# Patient Record
Sex: MALE | Race: OTHER | HISPANIC OR LATINO | URBAN - METROPOLITAN AREA
[De-identification: names, ages, dates, MRNs, and addresses within clinical notes are randomized per-mention and may not be internally consistent; named-entity substitution may affect disease eponyms.]

---

## 2022-10-11 ENCOUNTER — HOSPITAL ENCOUNTER (EMERGENCY)
Facility: MEDICAL CENTER | Age: 22
End: 2022-10-11
Attending: EMERGENCY MEDICINE

## 2022-10-11 VITALS
SYSTOLIC BLOOD PRESSURE: 134 MMHG | DIASTOLIC BLOOD PRESSURE: 81 MMHG | WEIGHT: 125.88 LBS | TEMPERATURE: 97.8 F | RESPIRATION RATE: 16 BRPM | BODY MASS INDEX: 20.23 KG/M2 | HEART RATE: 75 BPM | OXYGEN SATURATION: 99 % | HEIGHT: 66 IN

## 2022-10-11 DIAGNOSIS — R06.00 DYSPNEA, UNSPECIFIED TYPE: ICD-10-CM

## 2022-10-11 LAB — EKG IMPRESSION: NORMAL

## 2022-10-11 PROCEDURE — 93005 ELECTROCARDIOGRAM TRACING: CPT | Performed by: EMERGENCY MEDICINE

## 2022-10-11 PROCEDURE — 93005 ELECTROCARDIOGRAM TRACING: CPT

## 2022-10-11 PROCEDURE — 99283 EMERGENCY DEPT VISIT LOW MDM: CPT

## 2022-10-11 ASSESSMENT — ENCOUNTER SYMPTOMS: DIAPHORESIS: 0

## 2022-10-11 NOTE — ED NOTES
Pt is discharged. Paperwork explained and all questions answered by ERP. All belongings sent with pt upon departure. Pt ambulatory with a steady gait out of ED.
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
## 2022-12-14 ENCOUNTER — APPOINTMENT (OUTPATIENT)
Dept: RADIOLOGY | Facility: MEDICAL CENTER | Age: 22
End: 2022-12-14
Attending: EMERGENCY MEDICINE

## 2022-12-14 ENCOUNTER — HOSPITAL ENCOUNTER (EMERGENCY)
Facility: MEDICAL CENTER | Age: 22
End: 2022-12-14
Attending: EMERGENCY MEDICINE

## 2022-12-14 VITALS
TEMPERATURE: 98.1 F | DIASTOLIC BLOOD PRESSURE: 84 MMHG | WEIGHT: 121.47 LBS | SYSTOLIC BLOOD PRESSURE: 122 MMHG | HEART RATE: 85 BPM | BODY MASS INDEX: 19.52 KG/M2 | RESPIRATION RATE: 18 BRPM | OXYGEN SATURATION: 97 % | HEIGHT: 66 IN

## 2022-12-14 DIAGNOSIS — R00.2 PALPITATIONS: ICD-10-CM

## 2022-12-14 LAB — EKG IMPRESSION: NORMAL

## 2022-12-14 PROCEDURE — 71045 X-RAY EXAM CHEST 1 VIEW: CPT

## 2022-12-14 PROCEDURE — 93005 ELECTROCARDIOGRAM TRACING: CPT | Performed by: EMERGENCY MEDICINE

## 2022-12-14 PROCEDURE — 99283 EMERGENCY DEPT VISIT LOW MDM: CPT

## 2022-12-15 NOTE — DISCHARGE INSTRUCTIONS
This is a recurring problem, your EKG is normal, your physical exam is normal and your chest x-ray is normal.    At this time you are stable for discharge home referral has been placed for primary care for follow-up.  Please call the number provided above to make a follow-up appointment.  Return for change or worsening symptoms

## 2022-12-15 NOTE — ED TRIAGE NOTES
"Ulices Isaacs  Chief Complaint   Patient presents with    Other     Pt ambulatroy to triage with above complaint. Pt needing , Canadian used, but pt informed  he speaksa \"Tasiaaxel Beltrán\".   Difficult to obtain pt's main complaint.  Pt reports he was seen here 2.5 months ago for the same. Pt reports same s/s today. Pt states \"I feel like sometimes my heart isn't working right. At times I feel like I can't catch my breathe and I put my had on my chest, over the heart, and I can make myself take a breath.\" Pt speaking in full sentences, NAD noted. VSS.     /74   Pulse 92   Temp 36.5 °C (97.7 °F) (Temporal)   Resp 18   Ht 1.676 m (5' 6\")   Wt 55.1 kg (121 lb 7.6 oz)   SpO2 100%   BMI 19.61 kg/m²     Pt informed of triage process and encouraged to notify staff of any changes or concerns. Pt verbalized understanding of instructions. Apologized for long wait time. Pt placed back in lobby.     "

## 2023-01-15 ENCOUNTER — TELEPHONE (OUTPATIENT)
Dept: HEALTH INFORMATION MANAGEMENT | Facility: OTHER | Age: 23
End: 2023-01-15

## 2023-02-26 ENCOUNTER — HOSPITAL ENCOUNTER (EMERGENCY)
Facility: MEDICAL CENTER | Age: 23
End: 2023-02-26
Attending: EMERGENCY MEDICINE

## 2023-02-26 VITALS
TEMPERATURE: 97 F | DIASTOLIC BLOOD PRESSURE: 67 MMHG | SYSTOLIC BLOOD PRESSURE: 100 MMHG | OXYGEN SATURATION: 98 % | RESPIRATION RATE: 15 BRPM | BODY MASS INDEX: 20.47 KG/M2 | WEIGHT: 119.93 LBS | HEIGHT: 64 IN | HEART RATE: 104 BPM

## 2023-02-26 DIAGNOSIS — K20.90 ESOPHAGITIS, UNSPECIFIED WITHOUT BLEEDING: ICD-10-CM

## 2023-02-26 PROCEDURE — 700102 HCHG RX REV CODE 250 W/ 637 OVERRIDE(OP): Performed by: EMERGENCY MEDICINE

## 2023-02-26 PROCEDURE — 99283 EMERGENCY DEPT VISIT LOW MDM: CPT

## 2023-02-26 PROCEDURE — A9270 NON-COVERED ITEM OR SERVICE: HCPCS | Performed by: EMERGENCY MEDICINE

## 2023-02-26 RX ADMIN — LIDOCAINE HYDROCHLORIDE 30 ML: 20 SOLUTION OROPHARYNGEAL at 13:52

## 2023-02-26 NOTE — ED NOTES
Patient discharged home per ERP.  Discharge teaching and education discussed with patient.   Patient verbalized understanding of discharge teaching and education. No other questions at this time.       VSS. Patient alert and oriented. Patient arranged ride for self. Able to ambulate off unit safely with steady gait.

## 2023-02-26 NOTE — ED PROVIDER NOTES
"ED Provider Note    CHIEF COMPLAINT  Chief Complaint   Patient presents with    Other     Reports that he was eating tortilla chips with beans on it and report that he can feel a chip stuck in his throat.  Pt managing secretions taking sips of water and keeping it down.         EXTERNAL RECORDS REVIEWED  None available    HPI/ROS  LIMITATION TO HISTORY   None  OUTSIDE HISTORIAN(S):  None    Ulices Gandara is a 22 y.o. male who presents for evaluation of the subjective sensation of something stuck in his throat.  The patient reports he was eating a tortilla chip with a rough edge.  When he swallowed it he felt \"scraping \"sensation in his esophagus.  He reports ongoing pain.  He is able to eat and drink.  He specifically denies any hematemesis hemoptysis hematochezia or melena.  Patient is an otherwise healthy 22-year-old male with no significant medical or surgical history.  Incident occurred around an hour ago.  In triage and on arrival here he is drinking water from a bottle and managing his secretions    PAST MEDICAL HISTORY   No significant medical history    SURGICAL HISTORY  patient denies any surgical history  None reported  FAMILY HISTORY  History reviewed. No pertinent family history.  Noncontributory  SOCIAL HISTORY  Social History     Tobacco Use    Smoking status: Never    Smokeless tobacco: Never   Vaping Use    Vaping Use: Never used   Substance and Sexual Activity    Alcohol use: Not Currently    Drug use: Not Currently    Sexual activity: Not on file       CURRENT MEDICATIONS  Home Medications       Reviewed by Agata Clements R.N. (Registered Nurse) on 02/26/23 at 1305  Med List Status: Complete     Medication Last Dose Status        Patient Tello Taking any Medications                           ALLERGIES  No Known Allergies    PHYSICAL EXAM  VITAL SIGNS: /71   Pulse 94   Temp 36.4 °C (97.6 °F) (Temporal)   Resp 16   Ht 1.626 m (5' 4\")   Wt 54.4 kg (119 lb 14.9 oz)   SpO2 99%   " BMI 20.59 kg/m²    Pulse ox interpretation: I interpret this pulse ox as normal.  Constitutional: Alert and oriented x 3, no acute distress  HEENT: Atraumatic normocephalic, pupils are equal round reactive to light extraocular movements are intact. The nares is clear, external ears are normal, mouth shows moist mucous membranes normal dentition for age  Neck: Supple, no JVD no tracheal deviation  Cardiovascular: Regular rate and rhythm no murmur rub or gallop 2+ pulses peripherally x4  Thorax & Lungs: No respiratory distress, no wheezes rales or rhonchi, No chest tenderness.   GI: Soft nontender nondistended positive bowel sounds, no peritoneal signs  Skin: Warm dry no acute rash or lesion  Musculoskeletal: Moving all extremities with full range and 5 of 5 strength no acute  deformity  Neurologic: Cranial nerves III through XII are grossly intact no sensory deficit no cerebellar dysfunction   Psychiatric: Appropriate affect for situation at this time          DIAGNOSTIC STUDIES / PROCEDURES      COURSE & MEDICAL DECISION MAKING    ED Observation Status? No; Patient does not meet criteria for ED Observation.     INITIAL ASSESSMENT, COURSE AND PLAN  Care Narrative: This is a very pleasant 22-year-old gentleman who presents here with a episode of pain after swallowing an unknown masticated chip.  I suspect he has a superficial esophageal abrasion.  He is able to drink water without any spitting up or frothing at the mouth.  I have very low suspicion of complete esophageal obstruction or full-thickness tear.  He was given a GI cocktail with significant relief.  I counseled him to take over-the-counter Maalox  ADDITIONAL PROBLEM LIST    DISPOSITION AND DISCUSSIONS    Escalation of care considered, and ultimately not performed:IV fluids, blood analysis, and diagnostic imaging    Barriers to care at this time, including but not limited to: Patient does not have established PCP.         FINAL DIAGNOSIS  Esophageal  abrasion       Electronically signed by: Mauricio Rodriguez M.D., 2/26/2023 1:43 PM

## 2023-02-26 NOTE — ED TRIAGE NOTES
Pt ambulated to triage with   Chief Complaint   Patient presents with    Other     Reports that he was eating tortilla chips with beans on it and report that he can feel a chip stuck in his throat.  Pt managing secretions taking sips of water and keeping it down.        Pt Informed regarding triage process and verbalized understanding to inform triage tech or RN for any changes in condition. Placed in lobby.

## 2023-02-27 ENCOUNTER — APPOINTMENT (OUTPATIENT)
Dept: RADIOLOGY | Facility: MEDICAL CENTER | Age: 23
End: 2023-02-27
Attending: EMERGENCY MEDICINE

## 2023-02-27 ENCOUNTER — HOSPITAL ENCOUNTER (EMERGENCY)
Facility: MEDICAL CENTER | Age: 23
End: 2023-02-27
Attending: EMERGENCY MEDICINE

## 2023-02-27 VITALS
OXYGEN SATURATION: 98 % | DIASTOLIC BLOOD PRESSURE: 84 MMHG | BODY MASS INDEX: 18.71 KG/M2 | TEMPERATURE: 97.4 F | HEIGHT: 66 IN | RESPIRATION RATE: 19 BRPM | WEIGHT: 116.4 LBS | SYSTOLIC BLOOD PRESSURE: 115 MMHG | HEART RATE: 62 BPM

## 2023-02-27 DIAGNOSIS — J02.9 PHARYNGITIS, UNSPECIFIED ETIOLOGY: ICD-10-CM

## 2023-02-27 LAB — S PYO DNA SPEC NAA+PROBE: NOT DETECTED

## 2023-02-27 PROCEDURE — 87651 STREP A DNA AMP PROBE: CPT

## 2023-02-27 PROCEDURE — 70360 X-RAY EXAM OF NECK: CPT

## 2023-02-27 PROCEDURE — 99283 EMERGENCY DEPT VISIT LOW MDM: CPT

## 2023-02-27 NOTE — ED TRIAGE NOTES
Ulices Isaacs  22 y.o.  Chief Complaint   Patient presents with    Sore Throat     Ambulatory with steady gait for above, pt reporting food stuck in throat around 1400 yesterday, able to get it unstuck and was seen for this earlier. Was told to come back if the pain was not better, pt continues to have pain in throat. A&Ox4, speaking in full sentences.     Devendra [id number] used for encounter.

## 2023-02-27 NOTE — ED NOTES
ERP at chair side with iPad  to discuss DC instructions. All questions answered by ERP at this time. All belongings sent with pt upon departure. Pt ambulatory with a steady gait out of ED.

## 2023-02-27 NOTE — ED PROVIDER NOTES
"ED Provider Note    CHIEF COMPLAINT  Chief Complaint   Patient presents with    Sore Throat       EXTERNAL RECORDS REVIEWED  Other none available    HPI/ROS  LIMITATION TO HISTORY   Select: Language Dominican,  Used   OUTSIDE HISTORIAN(S):  None    Ulices Isaacs is a 22 y.o. male who presents to the Emergency Department with persistent sore throat.  Reportedly having multiday discomfort to his throat after eating at a started.  He believes that it was a turkey causing injury.  Reportedly given return precautions to the ER should he have persistent pain which she is now having.     used for history.    No finding of ER visit within the [facility] system as the patient is reporting    PAST MEDICAL HISTORY       SURGICAL HISTORY  patient denies any surgical history    FAMILY HISTORY  No family history on file.    SOCIAL HISTORY  Social History     Tobacco Use    Smoking status: Never    Smokeless tobacco: Never   Vaping Use    Vaping Use: Never used   Substance and Sexual Activity    Alcohol use: Never    Drug use: Never    Sexual activity: Not on file       CURRENT MEDICATIONS  Home Medications       Reviewed by Katheryn Larson R.N. (Registered Nurse) on 02/27/23 at 0107  Med List Status: Not Addressed     Medication Last Dose Status        Patient Tello Taking any Medications                           ALLERGIES  No Known Allergies    PHYSICAL EXAM  VITAL SIGNS: /56   Pulse (!) 57   Temp 36.2 °C (97.1 °F) (Temporal)   Resp 16   Ht 1.676 m (5' 6\")   Wt 52.8 kg (116 lb 6.5 oz)   SpO2 98%   BMI 18.79 kg/m²      Pulse ox interpretation: I interpret this pulse ox as normal.  Constitutional: Alert in no apparent distress.  HENT: No signs of trauma, Bilateral external ears normal, Nose normal.   Posterior pharynx: No obvious signs of trauma.  No bleeding.  No significant findings of pharyngitis.  Noted tongue or uvular deviation or edema.  Toleration of secretions.  Good phonation of " voice.  Eyes: Pupils are equal and reactive  Neck: Normal range of motion, No tenderness, Supple  Cardiovascular: Regular rate and rhythm, no murmurs.   Thorax & Lungs: Normal breath sounds, No respiratory distress  Skin: Warm, Dry  Neurologic: Alert , Normal motor function, Normal sensory function, No focal deficits noted.   Psychiatric: Affect normal, Judgment normal, Mood normal.         DIAGNOSTIC STUDIES / PROCEDURES    LABS  Results for orders placed or performed during the hospital encounter of 02/27/23   Group A Strep by PCR    Specimen: Throat   Result Value Ref Range    Group A Strep by PCR Not Detected Not Detected         RADIOLOGY  I have independently interpreted the diagnostic imaging associated with this visit and am waiting the final reading from the radiologist.   My preliminary interpretation is a follows: X-ray: No obvious soft tissue deviations to suggest deep space infection.  Also no noted foreign body    Radiologist interpretation:   DX-NECK FOR SOFT TISSUE   Final Result      1.  Negative soft tissue neck.            COURSE & MEDICAL DECISION MAKING    ED Observation Status? No; Patient does not meet criteria for ED Observation.     INITIAL ASSESSMENT, COURSE AND PLAN  Care Narrative: Patient presented emerge apartment for persistent sore throat.  History as above.  Patient was recently seen for the same per his subjective history.  Will evaluate for retained foreign body or other soft tissue inflammatory process.  Nontraumatic causation of pharyngitis also will be considered with strep testing.        DISPOSITION AND DISCUSSIONS  I have discussed management of the patient with the following physicians and ZULEMA's: None    Discussion of management with other QHP or appropriate source(s): None     Escalation of care considered, and ultimately not performed:blood analysis and diagnostic imaging; advanced imaging    Barriers to care at this time, including but not limited to: Patient does not  have established PCP.     22-year-old male presented emerged part with persistent sore throat.  History as above.  Patient initially having discomfort after eating [place] Today's work-up is benign.  Strep test is negative.  Imaging also benign.  No obvious retained foreign body or evidence of deep space infection.  At this point I do not believe that hematologic work-up or advanced imaging will be required given a benign exam.  I will have him continue with over-the-counter anti-inflammatories and he is understanding return precautions and abilities if needed.        FINAL DIAGNOSIS  1. Pharyngitis, unspecified etiology           Electronically signed by: Mauricio Palacios M.D., 2/27/2023 4:14 AM

## 2023-02-28 ENCOUNTER — APPOINTMENT (OUTPATIENT)
Dept: RADIOLOGY | Facility: MEDICAL CENTER | Age: 23
End: 2023-02-28
Attending: EMERGENCY MEDICINE

## 2023-02-28 ENCOUNTER — HOSPITAL ENCOUNTER (EMERGENCY)
Facility: MEDICAL CENTER | Age: 23
End: 2023-03-01
Attending: EMERGENCY MEDICINE

## 2023-02-28 DIAGNOSIS — R09.A2 GLOBUS PHARYNGEUS: ICD-10-CM

## 2023-02-28 PROCEDURE — 99284 EMERGENCY DEPT VISIT MOD MDM: CPT

## 2023-02-28 PROCEDURE — 93005 ELECTROCARDIOGRAM TRACING: CPT | Performed by: EMERGENCY MEDICINE

## 2023-02-28 PROCEDURE — 80048 BASIC METABOLIC PNL TOTAL CA: CPT

## 2023-02-28 PROCEDURE — 85025 COMPLETE CBC W/AUTO DIFF WBC: CPT

## 2023-02-28 PROCEDURE — 700111 HCHG RX REV CODE 636 W/ 250 OVERRIDE (IP): Performed by: EMERGENCY MEDICINE

## 2023-02-28 PROCEDURE — 36415 COLL VENOUS BLD VENIPUNCTURE: CPT

## 2023-02-28 PROCEDURE — A9270 NON-COVERED ITEM OR SERVICE: HCPCS | Performed by: EMERGENCY MEDICINE

## 2023-02-28 PROCEDURE — 93005 ELECTROCARDIOGRAM TRACING: CPT

## 2023-02-28 PROCEDURE — 96374 THER/PROPH/DIAG INJ IV PUSH: CPT

## 2023-02-28 PROCEDURE — 700102 HCHG RX REV CODE 250 W/ 637 OVERRIDE(OP): Performed by: EMERGENCY MEDICINE

## 2023-02-28 RX ORDER — LORAZEPAM 2 MG/ML
0.5 INJECTION INTRAMUSCULAR ONCE
Status: COMPLETED | OUTPATIENT
Start: 2023-03-01 | End: 2023-02-28

## 2023-02-28 RX ADMIN — LIDOCAINE HYDROCHLORIDE 30 ML: 20 SOLUTION OROPHARYNGEAL at 23:12

## 2023-02-28 RX ADMIN — LORAZEPAM 0.5 MG: 2 INJECTION INTRAMUSCULAR; INTRAVENOUS at 23:43

## 2023-03-01 VITALS
HEART RATE: 90 BPM | WEIGHT: 117.5 LBS | TEMPERATURE: 97.5 F | BODY MASS INDEX: 20.17 KG/M2 | RESPIRATION RATE: 16 BRPM | OXYGEN SATURATION: 96 % | SYSTOLIC BLOOD PRESSURE: 106 MMHG | DIASTOLIC BLOOD PRESSURE: 55 MMHG

## 2023-03-01 LAB
ANION GAP SERPL CALC-SCNC: 14 MMOL/L (ref 7–16)
BASOPHILS # BLD AUTO: 0.4 % (ref 0–1.8)
BASOPHILS # BLD: 0.04 K/UL (ref 0–0.12)
BUN SERPL-MCNC: 11 MG/DL (ref 8–22)
CALCIUM SERPL-MCNC: 9.9 MG/DL (ref 8.5–10.5)
CHLORIDE SERPL-SCNC: 101 MMOL/L (ref 96–112)
CO2 SERPL-SCNC: 23 MMOL/L (ref 20–33)
CREAT SERPL-MCNC: 0.67 MG/DL (ref 0.5–1.4)
EKG IMPRESSION: NORMAL
EOSINOPHIL # BLD AUTO: 0.11 K/UL (ref 0–0.51)
EOSINOPHIL NFR BLD: 1 % (ref 0–6.9)
ERYTHROCYTE [DISTWIDTH] IN BLOOD BY AUTOMATED COUNT: 38.9 FL (ref 35.9–50)
GFR SERPLBLD CREATININE-BSD FMLA CKD-EPI: 136 ML/MIN/1.73 M 2
GLUCOSE SERPL-MCNC: 121 MG/DL (ref 65–99)
HCT VFR BLD AUTO: 49.1 % (ref 42–52)
HGB BLD-MCNC: 16.9 G/DL (ref 14–18)
IMM GRANULOCYTES # BLD AUTO: 0.06 K/UL (ref 0–0.11)
IMM GRANULOCYTES NFR BLD AUTO: 0.5 % (ref 0–0.9)
LYMPHOCYTES # BLD AUTO: 1.84 K/UL (ref 1–4.8)
LYMPHOCYTES NFR BLD: 16.6 % (ref 22–41)
MCH RBC QN AUTO: 30.6 PG (ref 27–33)
MCHC RBC AUTO-ENTMCNC: 34.4 G/DL (ref 33.7–35.3)
MCV RBC AUTO: 88.8 FL (ref 81.4–97.8)
MONOCYTES # BLD AUTO: 0.55 K/UL (ref 0–0.85)
MONOCYTES NFR BLD AUTO: 5 % (ref 0–13.4)
NEUTROPHILS # BLD AUTO: 8.48 K/UL (ref 1.82–7.42)
NEUTROPHILS NFR BLD: 76.5 % (ref 44–72)
NRBC # BLD AUTO: 0 K/UL
NRBC BLD-RTO: 0 /100 WBC
PLATELET # BLD AUTO: 359 K/UL (ref 164–446)
PMV BLD AUTO: 8.4 FL (ref 9–12.9)
POTASSIUM SERPL-SCNC: 3.6 MMOL/L (ref 3.6–5.5)
RBC # BLD AUTO: 5.53 M/UL (ref 4.7–6.1)
SODIUM SERPL-SCNC: 138 MMOL/L (ref 135–145)
WBC # BLD AUTO: 11.1 K/UL (ref 4.8–10.8)

## 2023-03-01 PROCEDURE — 71250 CT THORAX DX C-: CPT

## 2023-03-01 PROCEDURE — 700117 HCHG RX CONTRAST REV CODE 255: Performed by: EMERGENCY MEDICINE

## 2023-03-01 RX ADMIN — IOHEXOL 25 ML: 240 INJECTION, SOLUTION INTRATHECAL; INTRAVASCULAR; INTRAVENOUS; ORAL at 00:45

## 2023-03-01 NOTE — ED TRIAGE NOTES
"Chief Complaint   Patient presents with    Chest Pain     Pain started several days ago, was seen here, and discharged. Pain went away and came back today. The pt describes pain as \"poking\" and radiates up to throat. The pt also reports pain upon inspiration with associated shortness of breath. The pt describes pain in throat as swelling. No excessive drooling and no swelling of neck or face noted.       Pt ambulatory to triage. Pt A&Ox4, for the above complaint.     Pt to lobby . Pt educated on alerting staff in changes to condition. Pt verbalized understanding. Protocol ECG ordered.     /70   Pulse 78   Temp 36.4 °C (97.5 °F) (Temporal)   Resp 14   Wt 53.3 kg (117 lb 8.1 oz)   SpO2 97%   BMI 20.17 kg/m²     "

## 2023-03-01 NOTE — ED NOTES
Patient discharged home to self care, provided with paper copy of discharge instructions. Discussed discharge instructions and follow up appointments using language lines  number [id number], patient verbalizes understanding. Vital signs stable, escorted out to ED lobby.

## 2023-03-01 NOTE — ED PROVIDER NOTES
"ED Provider Note    CHIEF COMPLAINT  Chief Complaint   Patient presents with    Chest Pain     Pain started several days ago, was seen here, and discharged. Pain went away and came back today. The pt describes pain as \"poking\" and radiates up to throat. The pt also reports pain upon inspiration with associated shortness of breath. The pt describes pain in throat as swelling. No excessive drooling and no swelling of neck or face noted.       EXTERNAL RECORDS REVIEWED  Other 2 prior emergency department notes:    HPI/ROS  LIMITATION TO HISTORY   Select: : None  OUTSIDE HISTORIAN(S):  Friend      Ulices Gandara is a 22 y.o. male who presents to the emergency department chief complaint of dysphagia.  This is patient's third visit to our facility for this complaint.  Initially started after he ate a chip that was not completely chewed up.  Patient's had x-rays he has been given GI cocktails and had temporary relief.  This evening he returns saying that he is having difficulty swallowing.  No respiratory distress pain is moderate worse with with swallowing no other acute symptoms or concerns.  He has no pain in his chest no respiratory issues no other acute symptom change or concern.    PAST MEDICAL HISTORY   NONE     SURGICAL HISTORY  patient denies any surgical history    FAMILY HISTORY  No family history on file.    SOCIAL HISTORY  Social History     Tobacco Use    Smoking status: Never    Smokeless tobacco: Never   Vaping Use    Vaping Use: Never used   Substance and Sexual Activity    Alcohol use: Not Currently    Drug use: Not Currently    Sexual activity: Not on file       CURRENT MEDICATIONS  Home Medications       Reviewed by Gabriel Esparza R.N. (Registered Nurse) on 02/28/23 at 4147  Med List Status: Partial     Medication Last Dose Status        Patient Tello Taking any Medications                           ALLERGIES  No Known Allergies    PHYSICAL EXAM  VITAL SIGNS: /70   Pulse 78   Temp 36.4 °C " (97.5 °F) (Temporal)   Resp 14   Wt 53.3 kg (117 lb 8.1 oz)   SpO2 97%   BMI 20.17 kg/m²    Pulse ox interpretation: I interpret this pulse ox as normal.  Constitutional: Alert and oriented x 3, no acute distress  HEENT: Atraumatic normocephalic, pupils are equal round reactive to light extraocular movements are intact. The nares is clear, external ears are normal, mouth shows moist mucous membranes normal dentition for age  Neck: Supple, no JVD no tracheal deviation  Cardiovascular: Regular rate and rhythm no murmur rub or gallop 2+ pulses peripherally x4  Thorax & Lungs: No respiratory distress, no wheezes rales or rhonchi, No chest tenderness.   GI: Soft nontender nondistended positive bowel sounds, no peritoneal signs  Skin: Warm dry no acute rash or lesion  Musculoskeletal: Moving all extremities with full range and 5 of 5 strength no acute  deformity  Neurologic: Cranial nerves III through XII are grossly intact no sensory deficit no cerebellar dysfunction   Psychiatric: Appropriate affect for situation at this time          DIAGNOSTIC STUDIES / PROCEDURES    Results for orders placed or performed during the hospital encounter of 02/28/23   CBC WITH DIFFERENTIAL   Result Value Ref Range    WBC 11.1 (H) 4.8 - 10.8 K/uL    RBC 5.53 4.70 - 6.10 M/uL    Hemoglobin 16.9 14.0 - 18.0 g/dL    Hematocrit 49.1 42.0 - 52.0 %    MCV 88.8 81.4 - 97.8 fL    MCH 30.6 27.0 - 33.0 pg    MCHC 34.4 33.7 - 35.3 g/dL    RDW 38.9 35.9 - 50.0 fL    Platelet Count 359 164 - 446 K/uL    MPV 8.4 (L) 9.0 - 12.9 fL    Neutrophils-Polys 76.50 (H) 44.00 - 72.00 %    Lymphocytes 16.60 (L) 22.00 - 41.00 %    Monocytes 5.00 0.00 - 13.40 %    Eosinophils 1.00 0.00 - 6.90 %    Basophils 0.40 0.00 - 1.80 %    Immature Granulocytes 0.50 0.00 - 0.90 %    Nucleated RBC 0.00 /100 WBC    Neutrophils (Absolute) 8.48 (H) 1.82 - 7.42 K/uL    Lymphs (Absolute) 1.84 1.00 - 4.80 K/uL    Monos (Absolute) 0.55 0.00 - 0.85 K/uL    Eos (Absolute) 0.11 0.00  - 0.51 K/uL    Baso (Absolute) 0.04 0.00 - 0.12 K/uL    Immature Granulocytes (abs) 0.06 0.00 - 0.11 K/uL    NRBC (Absolute) 0.00 K/uL   BASIC METABOLIC PANEL   Result Value Ref Range    Sodium 138 135 - 145 mmol/L    Potassium 3.6 3.6 - 5.5 mmol/L    Chloride 101 96 - 112 mmol/L    Co2 23 20 - 33 mmol/L    Glucose 121 (H) 65 - 99 mg/dL    Bun 11 8 - 22 mg/dL    Creatinine 0.67 0.50 - 1.40 mg/dL    Calcium 9.9 8.5 - 10.5 mg/dL    Anion Gap 14.0 7.0 - 16.0   ESTIMATED GFR   Result Value Ref Range    GFR (CKD-EPI) 136 >60 mL/min/1.73 m 2   EKG   Result Value Ref Range    Report       [facility] Emergency Dept.    Test Date:  2023  Pt Name:    ALLISON FLOREZ                 Department: ER  MRN:        [id number]                      Room:  Gender:     Male                         Technician: [id number]  :        2000                   Requested By:ER TRIAGE PROTOCOL  Order #:    [id number]                    Reading MD:    Measurements  Intervals                                Axis  Rate:       82                           P:          75  NC:         148                          QRS:        20  QRSD:       100                          T:          32  QT:         367  QTc:        429    Interpretive Statements  Sinus rhythm  No previous ECG available for comparison           RADIOLOGY  CT-CHEST (THORAX) W/O   Final Result         1.  Examination is somewhat limited due to poor esophageal distention with contrast despite 2 attempts. However there is no visualized air, contrast, or significant fluid collection in the mediastinum to indicate esophageal leak.            COURSE & MEDICAL DECISION MAKING    ED Observation Status? Yes; I am placing the patient in to an observation status due to a diagnostic uncertainty as well as therapeutic intensity. Patient placed in observation status at 2254, .     Observation plan is as follows: Patient will require evaluation of esophagus likely esophagram  will be given GI cocktail to see if the symptoms resolved.  Should he have resolution of his symptoms and negative esophagram likely will be able to be discharged however should we find abnormalities may require further evaluation including GI consult or endoscopy.    Upon Reevaluation, the patient's condition has: Improved; and will be discharged.    Patient discharged from ED Observation status at 0120 (Time) 3/1/23 (Date).     ASSESSMENT, COURSE AND PLAN    Care Narrative: 22-year-old male back for the third time with dysphagia.  Patient was given a GI cocktail and had total resolution of symptoms he is able to swallow.  We obtained CTA esophagram.  Poor quality no good opacification of the esophagus however there is no evidence of any contrast extravasation and there is obviously contrast in the fundus of the stomach.  There is no other noted abnormality on the scan.  Patient is tolerating p.o. intake after management here.  His laboratory evaluation is unremarkable EKG is unremarkable.  He is given a prescription for MBX.  Given instructions to take this every 6 hours as needed for pain to adhere to a liquid diet for the next several days and then advance as tolerated return for worsening pain difficulty swallowing breathing any other acute symptom change or concern otherwise discharged in stable and improved condition.        ADDITIONAL PROBLEM LIST    DISPOSITION AND DISCUSSIONS      /55   Pulse 90   Temp 36.4 °C (97.5 °F) (Temporal)   Resp 16   Wt 53.3 kg (117 lb 8.1 oz)   SpO2 96%   BMI 20.17 kg/m²     [address] [address]  [phone]    for establishment of primary care    [facility], Emergency Dept  [address] [facility] [address] [phone]    in 12-24 hours if symptoms persist, immediately If symptoms worsen, or if you develop any other symptoms or concerns        FINAL DIAGNOSIS  1. Globus pharyngeus Active           Electronically signed by: Hayes Wheeler M.D., 2/28/2023 10:54 PM

## 2023-03-01 NOTE — ED NOTES
"PIV placed, pt medicated per MAR. Pt highly anxious about PIV, is now restless and anxious in room, unable to sit still, tremulous. Pt states \"I don't know why you have to have an IV and do blood, they haven't done this before.\" Pt educated on escalation of care. ERP notified, at bedside, orders placed, medicated per MAR. Pt appears more relaxed at this time. Awaiting CT.  "

## 2023-10-27 ENCOUNTER — OCCUPATIONAL MEDICINE (OUTPATIENT)
Dept: URGENT CARE | Facility: CLINIC | Age: 23
End: 2023-10-27
Payer: COMMERCIAL

## 2023-10-27 ENCOUNTER — APPOINTMENT (OUTPATIENT)
Dept: RADIOLOGY | Facility: IMAGING CENTER | Age: 23
End: 2023-10-27
Attending: PHYSICIAN ASSISTANT

## 2023-10-27 ENCOUNTER — NON-PROVIDER VISIT (OUTPATIENT)
Dept: URGENT CARE | Facility: CLINIC | Age: 23
End: 2023-10-27

## 2023-10-27 VITALS
WEIGHT: 121 LBS | TEMPERATURE: 96.9 F | OXYGEN SATURATION: 97 % | HEIGHT: 64 IN | HEART RATE: 62 BPM | SYSTOLIC BLOOD PRESSURE: 120 MMHG | BODY MASS INDEX: 20.66 KG/M2 | DIASTOLIC BLOOD PRESSURE: 68 MMHG

## 2023-10-27 DIAGNOSIS — Z02.1 PRE-EMPLOYMENT DRUG SCREENING: ICD-10-CM

## 2023-10-27 DIAGNOSIS — S61.239A: ICD-10-CM

## 2023-10-27 DIAGNOSIS — Z02.1 PRE-EMPLOYMENT HEALTH SCREENING EXAMINATION: Primary | ICD-10-CM

## 2023-10-27 LAB
AMP AMPHETAMINE: NORMAL
BREATH ALCOHOL COMMENT: NORMAL
COC COCAINE: NORMAL
INT CON NEG: NORMAL
INT CON POS: NORMAL
MET METHAMPHETAMINES: NORMAL
OPI OPIATES: NORMAL
PCP PHENCYCLIDINE: NORMAL
POC BREATHALIZER: 0 PERCENT (ref 0–0.01)
POC DRUG COMMENT 753798-OCCUPATIONAL HEALTH: NEGATIVE
THC: NORMAL

## 2023-10-27 PROCEDURE — 90715 TDAP VACCINE 7 YRS/> IM: CPT | Performed by: NURSE PRACTITIONER

## 2023-10-27 PROCEDURE — 82075 ASSAY OF BREATH ETHANOL: CPT | Performed by: PHYSICIAN ASSISTANT

## 2023-10-27 PROCEDURE — 3074F SYST BP LT 130 MM HG: CPT | Performed by: PHYSICIAN ASSISTANT

## 2023-10-27 PROCEDURE — 3078F DIAST BP <80 MM HG: CPT | Performed by: PHYSICIAN ASSISTANT

## 2023-10-27 PROCEDURE — 90471 IMMUNIZATION ADMIN: CPT | Performed by: NURSE PRACTITIONER

## 2023-10-27 PROCEDURE — 80305 DRUG TEST PRSMV DIR OPT OBS: CPT | Performed by: PHYSICIAN ASSISTANT

## 2023-10-27 PROCEDURE — 73140 X-RAY EXAM OF FINGER(S): CPT | Mod: TC,LT | Performed by: RADIOLOGY

## 2023-10-27 PROCEDURE — 99204 OFFICE O/P NEW MOD 45 MIN: CPT | Mod: 25 | Performed by: PHYSICIAN ASSISTANT

## 2023-10-27 RX ORDER — CEPHALEXIN 500 MG/1
500 CAPSULE ORAL 3 TIMES DAILY
Qty: 30 CAPSULE | Refills: 0 | Status: SHIPPED | OUTPATIENT
Start: 2023-10-27 | End: 2023-11-01

## 2023-10-27 NOTE — LETTER
[facility] [address] [place] [address]  Phone:  [phone] - Fax:  [phone]   [facility] Progress Report and Disability Certification  Date of Service: 10/27/2023   No Show:  No  Date / Time of Next Visit: 10/29/2023   Claim Information   Patient Name: Ulices Gandara  Claim Number:     Employer: [employer]  Date of Injury: 10/27/2023     Insurer / TPA: [employer]  ID / SSN:     Occupation: labor  Diagnosis: The encounter diagnosis was Puncture wound of finger without foreign body without damage to nail, initial encounter.    Medical Information   Related to Industrial Injury? 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  He was able to pull out the nail and presents for evaluation of the injury.  Last tetanus is unknown.  He is left-hand dominant.  He has no history of immunocompromise.   Objective Findings: Alert nontoxic male no acute distress.  Puncture wound over dorsum left thumb proximal phalanx with surrounding edema.  Tenderness along MTP and IP, limited range of motion secondary to pain and swelling.  Neurovascularly intact.  Full strength with flexion extension, unable to abduct   Pre-Existing Condition(s):     Assessment:   Initial Visit    Status: Additional Care Required  Permanent Disability:No    Plan: Medication    Diagnostics: X-ray    Comments:  There is a subtle partial lucency projecting in the proximal left 1st phalanx which may be related to the history of nail gun injury. No radiopaque foreign body or displaced fracture.There is a subtle partial lucency projecting in the proximal left 1st phalanx which may be related to the history of nail gun injury. No radiopaque foreign body or displaced fracture.    Disability Information   Status: Released to Restricted Duty     From:  10/27/2023  Through: 10/29/2023 Restrictions are: Temporary   Physical Restrictions   Sitting:    Standing:    Stooping:    Bending:      Squatting:    Walking:    Climbing:    Pushing:      Pulling:    Other:    Reaching Above Shoulder (L):   Reaching Above Shoulder (R):       Reaching Below Shoulder (L):    Reaching Below Shoulder (R):      Not to exceed Weight Limits   Carrying(hrs):   Weight Limit(lb):   Lifting(hrs):   Weight  Limit(lb):     Comments: No use of left hand at work.  Return in 2 days for wound reevaluation.  Patient to start antibiotics immediately and keep wound covered    Repetitive Actions   Hands: i.e. Fine Manipulations from Grasping:     Feet: i.e. Operating Foot Controls:     Driving / Operate Machinery:     Health Care Provider’s Original or Electronic Signature  Maxx Sampson P.A.-C. Health Care Provider’s Original or Electronic Signature    Delfino Kwong DO MPH     Clinic Name / Location: [place] [address]  [place] [address] Clinic Phone Number: Dept: [phone]   Appointment Time: 10:30 Am Visit Start Time: 11:16 AM   Check-In Time:  10:54 Am Visit Discharge Time:  12:36 PM    Original-Treating Physician or Chiropractor    Page 2-Insurer/TPA    Page 3-Employer    Page 4-Employee

## 2023-10-27 NOTE — LETTER
"    EMPLOYEE’S CLAIM FOR COMPENSATION/ REPORT OF INITIAL TREATMENT  FORM C-4  PLEASE TYPE OR PRINT    EMPLOYEE’S CLAIM - PROVIDE ALL INFORMATION REQUESTED   First Name                    SHERON Elena Last Name  Ann Gandara Birthdate                    2000                Sex  []M  []F Claim Number (Insurer’s Use Only)     Home Address  [address] Age  23 y.o. 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 (if applicable)  Chris    How did this injury or occupational disease occur? (Be specific and answer in detail. Use additional sheet if necessary)  Chris virk   If you believe that you have an occupational disease, when did you first have knowledge of the disability and its relationship to your employment?  NO Witnesses to the Accident (if applicable)  Maged mahan      Nature of Injury or Occupational Disease  Workers' Compensation  Part(s) of Body Injured or Affected  Thumb (L) N/A N/A    I CERTIFY THAT THE ABOVE IS TRUE AND " CORRECT TO T HE BEST OF MY KNOWLEDGE AND THAT I HAVE PROVIDED THIS INFORMATION IN ORDER TO OBTAIN THE BENEFITS OF [place], OR [place]).  I HEREBY AUTHORIZE ANY PHYSICIAN, CHIROPRACTOR, SURGEON, PRACTITIONER OR ANY OTHER PERSON, ANY HOSPITAL, INCLUDING [facility] OR [facility], ANY  MEDICAL SERVICE ORGANIZATION, ANY INSURANCE COMPANY, OR OTHER INSTITUTION OR ORGANIZATION TO RELEASE TO EACH OTHER, ANY MEDICAL OR OTHER INFORMATION, INCLUDING BENEFITS PAID OR PAYABLE, PERTINENT TO THIS INJURY OR DISEASE, EXCEPT INFORMATION RELATIVE TO DIAGNOSIS, TREATMENT AND/OR COUNSELING FOR AIDS, PSYCHOLOGICAL CONDITIONS, ALCOHOL OR CONTROLLED SUBSTANCES, FOR WHICH I MUST GIVE SPECIFIC AUTHORIZATION.  A PHOTOSTAT OF THIS AUTHORIZATION SHALL BE VALID AS THE ORIGINAL.     Date   Place Employee’s Original or  *Electronic Signature   THIS REPORT MUST BE COMPLETED AND MAILED WITHIN 3 WORKING DAYS OF TREATMENT   Place  [facility]    Name of [facility]   Date 10/27/2023 Diagnosis and Description of Injury or Occupational Disease  (S61.239A) Puncture wound of finger without foreign body without damage to nail, initial encounter  The encounter diagnosis was Puncture wound of finger without foreign body without damage to nail, initial encounter. Is there evidence that the injured employee was under the influence of alcohol and/or another controlled substance at the time of accident?  []No  [] Yes (if yes, please explain)   Hour 11:16 AM  No   Treatment: Thoroughly irrigated and cleansed wound.  Assessment for neurovascular status.  Radiographs.  Tetanus updated.  Prophylactic antibiotics    Have you advised the patient to remain off work five days or more?   [] Yes Indicate dates: From   To    []No If no, is the injured employee capable of: [] full duty [] modified duty                                                              No  Yes  If modified duty, specify any limitations / restrictions:  No use of left hand at work                                                                                                                                                                                                                                                                                                                                                                                                               X-Ray Findings: Positive    From information given by the employee, together with medical evidence, can you directly connect this injury or occupational disease as job incurred?  []Yes   [] No Yes    Is additional medical care by a physician indicated? []Yes [] No  Yes    Do you know of any previous injury or disease contributing to this condition or occupational disease? []Yes [] No (Explain if yes)                          No   Date  10/27/2023 Print Health Care Provider’s Name  Maxx Sampson P.A.-C. I certify that the employer’s copy of  this form was delivered to the employer on:   Address  [address] [place] [address] [address]                       [facility] Zip  [address] Provider’s Tax ID Number  [id number]   Telephone  Dept: [phone]    Health Care Provider’s Original or Electronic Signature  e-MAXX Uriostegui P.A.-C. Degree (MD,DO, DC,PAClaudiaC,APRN)  PASAMMIE  Choose (if applicable)      ORIGINAL - TREATING HEALTHCARE PROVIDER PAGE 2 - INSURER/TPA PAGE 3 - EMPLOYER PAGE 4 - EMPLOYEE             Form C-4 (rev.08/23)        BRIEF DESCRIPTION OF RIGHTS AND BENEFITS  (Pursuant to [place] 616C.050)    Notice of Injury or Occupational Disease (Incident Report Form C-1): If an injury or occupational disease (OD) arises out of and in the course of employment, you must provide written notice to your employer as soon as practicable, but no later than 7 days after the accident or OD. Your employer  "shall maintain a sufficient supply of the required forms.    Claim for Compensation (Form C-4): If medical treatment is sought, the form C-4 is available at the place of initial treatment. A completed \"Claim for Compensation\" (Form C-4) must be filed within 90 days after an accident or OD. The treating physician or chiropractor must, within 3 working days after treatment, complete and mail to the employer, the employer's insurer and third-party , the Claim for Compensation.    Medical Treatment: If you require medical treatment for your on-the-job injury or OD, you may be required to select a physician or chiropractor from a list provided by your workers’ compensation insurer, if it has contracted with an Organization for Managed Care (MCO) or Preferred Provider Organization (PPO) or providers of health care. If your employer has not entered into a contract with an MCO or PPO, you may select a physician or chiropractor from the Panel of Physicians and Chiropractors. Any medical costs related to your industrial injury or OD will be paid by your insurer.    Temporary Total Disability (TTD): If your doctor has certified that you are unable to work for a period of at least 5 consecutive days, or 5 cumulative days in a 20-day period, or places restrictions on you that your employer does not accommodate, you may be entitled to TTD compensation.    Temporary Partial Disability (TPD): If the wage you receive upon reemployment is less than the compensation for TTD to which you are entitled, the insurer may be required to pay you TPD compensation to make up the difference. TPD can only be paid for a maximum of 24 months.    Permanent Partial Disability (PPD): When your medical condition is stable and there is an indication of a PPD as a result of your injury or OD, within 30 days, your insurer must arrange for an evaluation by a rating physician or chiropractor to determine the degree of your PPD. The amount of " your PPD award depends on the date of injury, the results of the PPD evaluation, your age and wage.    Permanent Total Disability (PTD): If you are medically certified by a treating physician or chiropractor as permanently and totally disabled and have been granted a PTD status by your insurer, you are entitled to receive monthly benefits not to exceed 66 2/3% of your average monthly wage. The amount of your PTD payments is subject to reduction if you previously received a lump-sum PPD award.    Vocational Rehabilitation Services: You may be eligible for vocational rehabilitation services if you are unable to return to the job due to a permanent physical impairment or permanent restrictions as a result of your injury or occupational disease.    Transportation and Per Nilesh Reimbursement: You may be eligible for travel expenses and per nilesh associated with medical treatment.    Reopening: You may be able to reopen your claim if your condition worsens after claim closure.     Appeal Process: If you disagree with a written determination issued by the insurer or the insurer does not respond to your request, you may appeal to the Department of Administration, , by following the instructions contained in your determination letter. You must appeal the determination within 70 days from the date of the determination letter at [address], [address], [place], [place] [address], or [address], [place] [address]. If you disagree with the  decision, you may appeal to the Department of Administration, . You must file your appeal within 30 days from the date of the  decision letter at [address], [address], [place], [place] [address], or [address] [facility] [address], [place] [address]. If you disagree with a decision of an , you may file a petition for judicial review with the District Court. You must  do so within 30 days of the Appeal Officer’s decision. You may be represented by an  at your own expense or you may contact the [facility] for possible representation.    [place]  for Injured Workers (NAIW): If you disagree with a  decision, you may request that NAIW represent you without charge at an  Hearing. For information regarding denial of benefits, you may contact the [facility] at: [address] [facility], [address], [place] [address], [phone], or [address] [facility], [address], [place] [address], [phone]    To File a Complaint with the Division: If you wish to file a complaint with the  of the Division of Industrial Relations (DIR),  please contact the Workers’ Compensation Section, [address], [address], [place], [place] [address], telephone [phone]) [phone], or [address], [address], [place], [place] [address], telephone [phone].    For assistance with Workers’ Compensation Issues: You may contact the [facility] Office for Consumer Health Assistance, [address], [address], [place], [place] [address], Toll Free 1-613.504.8042, Web site: http://[facility].[place].gov/Programs/NEGRITA E-mail: [email or web address]@[email or web address].[email or web address].[facility]              __________________________________________________________________                                    _________________            Employee Name / Signature                                                                                                                            Date                                                                                                                                                                                                                              D-2 (rev. 10/20)

## 2023-10-27 NOTE — PROGRESS NOTES
"Subjective:     Ulices Gandara is a 23 y.o. 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  He was able to pull out the nail and presents for evaluation of the injury.  Last tetanus is unknown.  He is left-hand dominant.  He has no history of immunocompromise.    PMH:   No pertinent past medical history to this problem  MEDS:  Medications were reviewed in EMR  ALLERGIES:  Allergies were reviewed in EMR  SOCHX:  Works as a /  FH:   No pertinent family history to this problem       Objective:     /68 (BP Location: Left arm, Patient Position: Sitting, BP Cuff Size: Adult)   Pulse 62   Temp 36.1 °C (96.9 °F)   Ht 1.626 m (5' 4\")   Wt 54.9 kg (121 lb)   SpO2 97%   PF 97 L/min   BMI 20.77 kg/m²     Alert nontoxic male no acute distress.  Puncture wound over dorsum left thumb proximal phalanx with surrounding edema.  Tenderness along MTP and IP, limited range of motion secondary to pain and swelling.  Neurovascularly intact.  Full strength with flexion extension, unable to abduct      RADIOLOGY RESULTS   DX-FINGER(S) 2+ LEFT    Result Date: 10/27/2023  10/27/2023 11:33 AM HISTORY/REASON FOR EXAM:  Pain/Deformity Following Trauma; nail gun vs thumb. puncture midshaft proximal phalynx. TECHNIQUE/EXAM DESCRIPTION AND NUMBER OF VIEWS:  3 views of the LEFT fingers. COMPARISON: None FINDINGS: There is a subtle lucency seen projecting to the proximal phalanx of the left 1st digit which may be related to the nail gun injury. There is no radiopaque foreign body. There is no complete fracture identified. Remainder of the left hand is unremarkable.     1.  There is a subtle partial lucency projecting in the proximal left 1st phalanx which may be related to the history of nail gun injury. No radiopaque foreign body or " displaced fracture.           Assessment/Plan:       1. Puncture wound of finger without foreign body without damage to nail, initial encounter  - DX-FINGER(S) 2+ LEFT; Future  - Referral to Hand Surgery  - Tdap =>6yo IM  - cephALEXin (KEFLEX) 500 MG Cap; Take 1 Capsule by mouth 3 times a day for 10 days.  Dispense: 30 Capsule; Refill: 0    Released to Restricted Duty FROM 10/27/2023 TO 10/29/2023  No use of left hand at work.  Return in 2 days for wound reevaluation.  Patient to start antibiotics immediately and keep wound covered  There is a subtle partial lucency projecting in the proximal left 1st phalanx which may be related to the history of nail gun injury. No radiopaque foreign body or displaced fracture.There is a subtle partial lucency projecting in the proximal left 1st phalanx which may be related to the history of nail gun injury. No radiopaque foreign body or displaced fracture.    Differential diagnosis, natural history, supportive care, and indications for immediate follow-up discussed.

## 2023-10-29 ENCOUNTER — OCCUPATIONAL MEDICINE (OUTPATIENT)
Dept: URGENT CARE | Facility: CLINIC | Age: 23
End: 2023-10-29
Payer: COMMERCIAL

## 2023-10-29 VITALS
OXYGEN SATURATION: 98 % | TEMPERATURE: 97.3 F | BODY MASS INDEX: 20.66 KG/M2 | HEIGHT: 64 IN | RESPIRATION RATE: 16 BRPM | HEART RATE: 70 BPM | WEIGHT: 121 LBS | DIASTOLIC BLOOD PRESSURE: 62 MMHG | SYSTOLIC BLOOD PRESSURE: 110 MMHG

## 2023-10-29 DIAGNOSIS — S61.239A: ICD-10-CM

## 2023-10-29 PROCEDURE — 3074F SYST BP LT 130 MM HG: CPT | Performed by: PHYSICIAN ASSISTANT

## 2023-10-29 PROCEDURE — 3078F DIAST BP <80 MM HG: CPT | Performed by: PHYSICIAN ASSISTANT

## 2023-10-29 PROCEDURE — 99213 OFFICE O/P EST LOW 20 MIN: CPT | Performed by: PHYSICIAN ASSISTANT

## 2023-10-29 NOTE — PROGRESS NOTES
"Subjective:     Ulices Gandara is a 23 y.o. male who presents for Other ( FV : 10/27/23/)      Date of injury 10/27/2023: Patient presents for second visit following nail gun puncture wound to his left proximal phalanx of thumb.  He reports the pain is slightly less, the swelling is mildly decreased, his range of motion is mildly improved.  He had tetanus updated at last visit and did commence antibiotic therapy.  He has been using Tylenol and ibuprofen for pain control    PMH:   No pertinent past medical history to this problem  MEDS:  Medications were reviewed in EMR  ALLERGIES:  Allergies were reviewed in EMR  SOCHX:  Works as a /[employer]  FH:   No pertinent family history to this problem       Objective:     /62   Pulse 70   Temp 36.3 °C (97.3 °F)   Resp 16   Ht 1.626 m (5' 4\")   Wt 54.9 kg (121 lb)   SpO2 98%   BMI 20.77 kg/m²     Alert nontoxic male in no acute distress.  Left thumb demonstrates improvement from prior exam by same provider with decreased swelling, puncture wound appears same with mild discoloration around the entry site but no surrounding erythema edema or fluctuance or drainage.  Increased range of motion of the IP and MTP    Assessment/Plan:       1. Puncture wound of finger without foreign body without damage to nail, initial encounter    Released to Restricted Duty FROM 10/29/2023 TO 11/1/2023  Keep left thumb wrapped at all times, no use of left hand at work.  Return in 3 days.  Return immediately if any signs of infection  Recommend very close follow-up, patient instructed to return immediately if he notes any worsening due to the high risk of infection.  Continue antibiotic therapy.  May space out visits more after next visit, depending on improvement patient may or may not need to follow-up with hand specialist versus occupational medicine    Differential diagnosis, natural history, supportive care, and indications for immediate follow-up discussed.    "

## 2023-10-29 NOTE — LETTER
[facility] [address] [place] [address]  Phone:  [phone] - Fax:  [phone]   [facility] Progress Report and Disability Certification  Date of Service: 10/29/2023   No Show:  No  Date / Time of Next Visit: 11/1/2023 @ 6:00 PM    Claim Information   Patient Name: Ulices Gandara  Claim Number:     Employer: [employer]  Date of Injury: 10/27/2023     Insurer / TPA: [employer]  ID / SSN:     Occupation: labor  Diagnosis: The encounter diagnosis was Puncture wound of finger without foreign body without damage to nail, initial encounter.    Medical Information   Related to Industrial Injury? Yes    Subjective Complaints:  Date of injury 10/27/2023: Patient presents for second visit following nail gun puncture wound to his left proximal phalanx of thumb.  He reports the pain is slightly less, the swelling is mildly decreased, his range of motion is mildly improved.  He had tetanus updated at last visit and did commence antibiotic therapy.  He has been using Tylenol and ibuprofen for pain control   Objective Findings: Alert nontoxic male in no acute distress.  Left thumb demonstrates improvement from prior exam by same provider with decreased swelling, puncture wound appears same with mild discoloration around the entry site but no surrounding erythema edema or fluctuance or drainage.  Increased range of motion of the IP and MTP   Pre-Existing Condition(s):     Assessment:   Condition Improved    Status: Additional Care Required  Permanent Disability:No    Plan:      Diagnostics:      Comments:  Recommend very close follow-up, patient instructed to return immediately if he notes any worsening due to the high risk of infection.  Continue antibiotic therapy.  May space out visits more after next visit, depending on improvement patient may or may not need to follow-up with hand specialist versus occupational medicine    Disability  Information   Status: Released to Restricted Duty    From:  10/29/2023  Through: 11/1/2023 Restrictions are: Temporary   Physical Restrictions   Sitting:    Standing:    Stooping:    Bending:      Squatting:    Walking:    Climbing:    Pushing:      Pulling:    Other:    Reaching Above Shoulder (L):   Reaching Above Shoulder (R):       Reaching Below Shoulder (L):    Reaching Below Shoulder (R):      Not to exceed Weight Limits   Carrying(hrs):   Weight Limit(lb):   Lifting(hrs):   Weight  Limit(lb):     Comments: Keep left thumb wrapped at all times, no use of left hand at work.  Return in 3 days.  Return immediately if any signs of infection    Repetitive Actions   Hands: i.e. Fine Manipulations from Grasping:     Feet: i.e. Operating Foot Controls:     Driving / Operate Machinery:     Health Care Provider’s Original or Electronic Signature  Maxx Sampson P.A.-C. Health Care Provider’s Original or Electronic Signature    Delfino Kwong DO MPH     Clinic Name / Location: [place] [address]  [place] [address] Clinic Phone Number: Dept: [phone]   Appointment Time: 10:00 Am Visit Start Time: 10:10 AM   Check-In Time:  10:05 Am Visit Discharge Time: 10:36 AM   Original-Treating Physician or Chiropractor    Page 2-Insurer/TPA    Page 3-Employer    Page 4-Employee

## 2023-11-01 ENCOUNTER — OCCUPATIONAL MEDICINE (OUTPATIENT)
Dept: URGENT CARE | Facility: CLINIC | Age: 23
End: 2023-11-01
Payer: COMMERCIAL

## 2023-11-01 VITALS
HEART RATE: 64 BPM | OXYGEN SATURATION: 100 % | WEIGHT: 121 LBS | DIASTOLIC BLOOD PRESSURE: 60 MMHG | SYSTOLIC BLOOD PRESSURE: 104 MMHG | TEMPERATURE: 98.2 F | BODY MASS INDEX: 22.26 KG/M2 | HEIGHT: 62 IN | RESPIRATION RATE: 16 BRPM

## 2023-11-01 DIAGNOSIS — S61.239D: ICD-10-CM

## 2023-11-01 PROCEDURE — 99213 OFFICE O/P EST LOW 20 MIN: CPT | Performed by: PHYSICIAN ASSISTANT

## 2023-11-01 PROCEDURE — 3078F DIAST BP <80 MM HG: CPT | Performed by: PHYSICIAN ASSISTANT

## 2023-11-01 PROCEDURE — 3074F SYST BP LT 130 MM HG: CPT | Performed by: PHYSICIAN ASSISTANT

## 2023-11-01 NOTE — LETTER
[facility] [address] [place] [address]  Phone:  [phone] - Fax:  [phone]   [facility] Progress Report and Disability Certification  Date of Service: 11/1/2023   No Show:  No  Date / Time of Next Visit:  1 week (11-08-23) @4:00 PM    Claim Information   Patient Name: Ulices Gandara  Claim Number:     Employer: [employer]  Date of Injury: 10/27/2023     Insurer / TPA: [employer]  ID / SSN:     Occupation: labor  Diagnosis: The encounter diagnosis was Puncture wound of finger without foreign body without damage to nail, subsequent encounter.    Medical Information   Related to Industrial Injury? Yes    Subjective Complaints:  DOI: 10/27/2023.  Puncture wound left thumb.  Symptoms have significantly improved.  Still having slight tenderness with lifting and palpation.  No redness, discharge or constitutional symptoms.  He is completing wound care and taking his antibiotic.  He is tolerating full duty.  No new concerns today.   Objective Findings: Well-healing puncture wounds to the left thumb.  Slight tenderness and swelling but no erythema, discharge or red streaking.  Full range of motion distal neurovascular intact.   Pre-Existing Condition(s):     Assessment:   Condition Improved    Status: Additional Care Required  Permanent Disability:No    Plan:      Diagnostics:      Comments:       Disability Information   Status: Released to Full Duty    From:     Through:   Restrictions are:     Physical Restrictions   Sitting:    Standing:    Stooping:    Bending:      Squatting:    Walking:    Climbing:    Pushing:      Pulling:    Other:    Reaching Above Shoulder (L):   Reaching Above Shoulder (R):       Reaching Below Shoulder (L):    Reaching Below Shoulder (R):      Not to exceed Weight Limits   Carrying(hrs):   Weight Limit(lb):   Lifting(hrs):   Weight  Limit(lb):     Comments:      Repetitive Actions   Hands: i.e. Fine  Manipulations from Grasping:     Feet: i.e. Operating Foot Controls:     Driving / Operate Machinery:     Health Care Provider’s Original or Electronic Signature  Maxiem Daigle P.A.-C. Health Care Provider’s Original or Electronic Signature    Delfino Kwong DO MPH     Clinic Name / Location: [place] [address]  [place] [address] Clinic Phone Number: Dept: [phone]   Appointment Time: 6:00 Pm Visit Start Time: 6:13 PM   Check-In Time:  6:01 Pm Visit Discharge Time:  6:28 PM    Original-Treating Physician or Chiropractor    Page 2-Insurer/TPA    Page 3-Employer    Page 4-Employee

## 2023-11-02 NOTE — PROGRESS NOTES
"Subjective     Ulices Gandara is a 23 y.o. male who presents with Hand Injury (WC FV DOI: 10/27/23 left hand thumb injury, the patient stated it's a little better.)      DOI: 10/27/2023.  Puncture wound left thumb.  Symptoms have significantly improved.  Still having slight tenderness with lifting and palpation.  No redness, discharge or constitutional symptoms.  He is completing wound care and taking his antibiotic.  He is tolerating full duty.  No new concerns today.     Hand Injury        ROS           Objective     /60 (BP Location: Left arm, Patient Position: Sitting, BP Cuff Size: Adult)   Pulse 64   Temp 36.8 °C (98.2 °F) (Temporal)   Resp 16   Ht 1.575 m (5' 2\")   Wt 54.9 kg (121 lb)   SpO2 100%   BMI 22.13 kg/m²      Physical Exam    Well-healing puncture wounds to the left thumb.  Slight tenderness and swelling but no erythema, discharge or red streaking.  Full range of motion distal neurovascular intact.                   Assessment & Plan        1. Puncture wound of finger without foreign body without damage to nail, subsequent encounter          Noted interval improvement without signs of infection or dehiscence.  Continue wound care and antibiotics.  Follow-up 1 week, sooner for any worsening or changing symptoms    Please note that this dictation was created using voice recognition software. I have made every reasonable attempt to correct obvious errors, but I expect that there are errors of grammar and possibly content that I did not discover before finalizing the note.               "

## 2023-11-08 ENCOUNTER — APPOINTMENT (OUTPATIENT)
Dept: URGENT CARE | Facility: CLINIC | Age: 23
End: 2023-11-08